# Patient Record
Sex: MALE | Race: WHITE | NOT HISPANIC OR LATINO | URBAN - METROPOLITAN AREA
[De-identification: names, ages, dates, MRNs, and addresses within clinical notes are randomized per-mention and may not be internally consistent; named-entity substitution may affect disease eponyms.]

---

## 2017-01-13 ENCOUNTER — OUTPATIENT (OUTPATIENT)
Dept: OUTPATIENT SERVICES | Facility: HOSPITAL | Age: 67
LOS: 1 days | End: 2017-01-13
Payer: COMMERCIAL

## 2017-01-13 VITALS
WEIGHT: 139.77 LBS | HEART RATE: 53 BPM | HEIGHT: 67 IN | SYSTOLIC BLOOD PRESSURE: 128 MMHG | TEMPERATURE: 97 F | RESPIRATION RATE: 16 BRPM | DIASTOLIC BLOOD PRESSURE: 60 MMHG

## 2017-01-13 DIAGNOSIS — Z01.818 ENCOUNTER FOR OTHER PREPROCEDURAL EXAMINATION: ICD-10-CM

## 2017-01-13 DIAGNOSIS — M67.919 UNSPECIFIED DISORDER OF SYNOVIUM AND TENDON, UNSPECIFIED SHOULDER: Chronic | ICD-10-CM

## 2017-01-13 DIAGNOSIS — I25.10 ATHEROSCLEROTIC HEART DISEASE OF NATIVE CORONARY ARTERY WITHOUT ANGINA PECTORIS: ICD-10-CM

## 2017-01-13 DIAGNOSIS — R91.8 OTHER NONSPECIFIC ABNORMAL FINDING OF LUNG FIELD: ICD-10-CM

## 2017-01-13 DIAGNOSIS — Z98.890 OTHER SPECIFIED POSTPROCEDURAL STATES: Chronic | ICD-10-CM

## 2017-01-13 DIAGNOSIS — I25.10 ATHEROSCLEROTIC HEART DISEASE OF NATIVE CORONARY ARTERY WITHOUT ANGINA PECTORIS: Chronic | ICD-10-CM

## 2017-01-13 LAB
ANION GAP SERPL CALC-SCNC: 12 MMOL/L — SIGNIFICANT CHANGE UP (ref 5–17)
APTT BLD: 34.6 SEC — SIGNIFICANT CHANGE UP (ref 27.5–37.4)
BLD GP AB SCN SERPL QL: SIGNIFICANT CHANGE UP
BUN SERPL-MCNC: 13 MG/DL — SIGNIFICANT CHANGE UP (ref 8–20)
CALCIUM SERPL-MCNC: 9.1 MG/DL — SIGNIFICANT CHANGE UP (ref 8.6–10.2)
CHLORIDE SERPL-SCNC: 100 MMOL/L — SIGNIFICANT CHANGE UP (ref 98–107)
CO2 SERPL-SCNC: 30 MMOL/L — HIGH (ref 22–29)
CREAT SERPL-MCNC: 0.65 MG/DL — SIGNIFICANT CHANGE UP (ref 0.5–1.3)
GLUCOSE SERPL-MCNC: 91 MG/DL — SIGNIFICANT CHANGE UP (ref 70–115)
HCT VFR BLD CALC: 39.9 % — LOW (ref 42–52)
HGB BLD-MCNC: 13.1 G/DL — LOW (ref 14–18)
INR BLD: 1.04 RATIO — SIGNIFICANT CHANGE UP (ref 0.88–1.16)
MCHC RBC-ENTMCNC: 29.2 PG — SIGNIFICANT CHANGE UP (ref 27–31)
MCHC RBC-ENTMCNC: 32.8 G/DL — SIGNIFICANT CHANGE UP (ref 32–36)
MCV RBC AUTO: 89.1 FL — SIGNIFICANT CHANGE UP (ref 80–94)
PLATELET # BLD AUTO: 253 K/UL — SIGNIFICANT CHANGE UP (ref 150–400)
POTASSIUM SERPL-MCNC: 4.2 MMOL/L — SIGNIFICANT CHANGE UP (ref 3.5–5.3)
POTASSIUM SERPL-SCNC: 4.2 MMOL/L — SIGNIFICANT CHANGE UP (ref 3.5–5.3)
PROTHROM AB SERPL-ACNC: 11.5 SEC — SIGNIFICANT CHANGE UP (ref 10–13.1)
RBC # BLD: 4.48 M/UL — LOW (ref 4.6–6.2)
RBC # FLD: 13.3 % — SIGNIFICANT CHANGE UP (ref 11–15.6)
SODIUM SERPL-SCNC: 142 MMOL/L — SIGNIFICANT CHANGE UP (ref 135–145)
TYPE + AB SCN PNL BLD: SIGNIFICANT CHANGE UP
WBC # BLD: 7.69 K/UL — SIGNIFICANT CHANGE UP (ref 4.8–10.8)
WBC # FLD AUTO: 7.69 K/UL — SIGNIFICANT CHANGE UP (ref 4.8–10.8)

## 2017-01-13 PROCEDURE — 85730 THROMBOPLASTIN TIME PARTIAL: CPT

## 2017-01-13 PROCEDURE — 86901 BLOOD TYPING SEROLOGIC RH(D): CPT

## 2017-01-13 PROCEDURE — G0463: CPT

## 2017-01-13 PROCEDURE — 85027 COMPLETE CBC AUTOMATED: CPT

## 2017-01-13 PROCEDURE — 86850 RBC ANTIBODY SCREEN: CPT

## 2017-01-13 PROCEDURE — 93005 ELECTROCARDIOGRAM TRACING: CPT

## 2017-01-13 PROCEDURE — 85610 PROTHROMBIN TIME: CPT

## 2017-01-13 PROCEDURE — 86900 BLOOD TYPING SEROLOGIC ABO: CPT

## 2017-01-13 PROCEDURE — 93010 ELECTROCARDIOGRAM REPORT: CPT

## 2017-01-13 PROCEDURE — 80048 BASIC METABOLIC PNL TOTAL CA: CPT

## 2017-01-13 RX ORDER — CEFAZOLIN SODIUM 1 G
2000 VIAL (EA) INJECTION ONCE
Qty: 0 | Refills: 0 | Status: DISCONTINUED | OUTPATIENT
Start: 2017-01-27 | End: 2017-01-30

## 2017-01-13 NOTE — PATIENT PROFILE ADULT. - LEARNING ASSESSMENT (PATIENT) ADDITIONAL COMMENTS
presurgical, surgical scrub instructions, pain management education given - verbalized understanding

## 2017-01-13 NOTE — H&P PST ADULT - PMH
Alcoholism /alcohol abuse  Last drink 30 years ago  CAD (coronary artery disease)    Hypertension    Hypothyroidism

## 2017-01-13 NOTE — H&P PST ADULT - HISTORY OF PRESENT ILLNESS
This is a 66 y.o male who presents to [facility] today.  The pt had a  PET scan in July 2016 which demonstrated an abnormal finding and a subsequent CT demonstrated it had enlarged.  He is scheduled for surgery in the near future.

## 2017-01-13 NOTE — H&P PST ADULT - NSANTHOSAYNRD_GEN_A_CORE
No. JAIDA screening performed.  STOP BANG Legend: 0-2 = LOW Risk; 3-4 = INTERMEDIATE Risk; 5-8 = HIGH Risk

## 2017-01-13 NOTE — H&P PST ADULT - PSH
CAD S/P percutaneous coronary angioplasty  Stent x 2  Disorder of rotator cuff  Left  S/P eye surgery  right

## 2017-01-13 NOTE — H&P PST ADULT - CARDIOVASCULAR COMMENTS
Distant heart tones noted but pt has audible murmur.  Radiating to bilateral carotid arteries where murmur is much more prominent

## 2017-01-17 DIAGNOSIS — R91.8 OTHER NONSPECIFIC ABNORMAL FINDING OF LUNG FIELD: ICD-10-CM

## 2017-01-17 DIAGNOSIS — Z01.818 ENCOUNTER FOR OTHER PREPROCEDURAL EXAMINATION: ICD-10-CM

## 2017-01-26 ENCOUNTER — RESULT REVIEW (OUTPATIENT)
Age: 67
End: 2017-01-26

## 2017-01-27 ENCOUNTER — INPATIENT (INPATIENT)
Facility: HOSPITAL | Age: 67
LOS: 3 days | Discharge: HOME | DRG: 165 | End: 2017-01-31
Attending: THORACIC SURGERY (CARDIOTHORACIC VASCULAR SURGERY) | Admitting: THORACIC SURGERY (CARDIOTHORACIC VASCULAR SURGERY)
Payer: COMMERCIAL

## 2017-01-27 ENCOUNTER — APPOINTMENT (OUTPATIENT)
Dept: THORACIC SURGERY | Facility: HOSPITAL | Age: 67
End: 2017-01-27
Payer: MEDICAID

## 2017-01-27 VITALS
SYSTOLIC BLOOD PRESSURE: 116 MMHG | HEIGHT: 67 IN | RESPIRATION RATE: 16 BRPM | TEMPERATURE: 98 F | HEART RATE: 50 BPM | DIASTOLIC BLOOD PRESSURE: 46 MMHG | OXYGEN SATURATION: 99 % | WEIGHT: 139.77 LBS

## 2017-01-27 DIAGNOSIS — I25.10 ATHEROSCLEROTIC HEART DISEASE OF NATIVE CORONARY ARTERY WITHOUT ANGINA PECTORIS: Chronic | ICD-10-CM

## 2017-01-27 DIAGNOSIS — M67.919 UNSPECIFIED DISORDER OF SYNOVIUM AND TENDON, UNSPECIFIED SHOULDER: Chronic | ICD-10-CM

## 2017-01-27 DIAGNOSIS — R91.8 OTHER NONSPECIFIC ABNORMAL FINDING OF LUNG FIELD: ICD-10-CM

## 2017-01-27 DIAGNOSIS — Z98.890 OTHER SPECIFIED POSTPROCEDURAL STATES: Chronic | ICD-10-CM

## 2017-01-27 LAB
BLD GP AB SCN SERPL QL: SIGNIFICANT CHANGE UP
TYPE + AB SCN PNL BLD: SIGNIFICANT CHANGE UP

## 2017-01-27 PROCEDURE — 71010: CPT | Mod: 26

## 2017-01-27 PROCEDURE — 32674 THORACOSCOPY LYMPH NODE EXC: CPT | Mod: AS

## 2017-01-27 PROCEDURE — S2900 ROBOTIC SURGICAL SYSTEM: CPT | Mod: NC

## 2017-01-27 PROCEDURE — 32674 THORACOSCOPY LYMPH NODE EXC: CPT

## 2017-01-27 PROCEDURE — 88342 IMHCHEM/IMCYTCHM 1ST ANTB: CPT | Mod: 26

## 2017-01-27 PROCEDURE — 32663 THORACOSCOPY W/LOBECTOMY: CPT

## 2017-01-27 PROCEDURE — 31622 DX BRONCHOSCOPE/WASH: CPT

## 2017-01-27 PROCEDURE — 88341 IMHCHEM/IMCYTCHM EA ADD ANTB: CPT | Mod: 26

## 2017-01-27 PROCEDURE — 32663 THORACOSCOPY W/LOBECTOMY: CPT | Mod: AS

## 2017-01-27 RX ORDER — ONDANSETRON 8 MG/1
4 TABLET, FILM COATED ORAL ONCE
Qty: 0 | Refills: 0 | Status: DISCONTINUED | OUTPATIENT
Start: 2017-01-27 | End: 2017-01-27

## 2017-01-27 RX ORDER — NALOXONE HYDROCHLORIDE 4 MG/.1ML
0.1 SPRAY NASAL
Qty: 0 | Refills: 0 | Status: DISCONTINUED | OUTPATIENT
Start: 2017-01-27 | End: 2017-01-27

## 2017-01-27 RX ORDER — CLOPIDOGREL BISULFATE 75 MG/1
75 TABLET, FILM COATED ORAL DAILY
Qty: 0 | Refills: 0 | Status: DISCONTINUED | OUTPATIENT
Start: 2017-01-28 | End: 2017-01-31

## 2017-01-27 RX ORDER — TIOTROPIUM BROMIDE 18 UG/1
1 CAPSULE ORAL; RESPIRATORY (INHALATION) DAILY
Qty: 0 | Refills: 0 | Status: DISCONTINUED | OUTPATIENT
Start: 2017-01-27 | End: 2017-01-31

## 2017-01-27 RX ORDER — ONDANSETRON 8 MG/1
4 TABLET, FILM COATED ORAL EVERY 6 HOURS
Qty: 0 | Refills: 0 | Status: DISCONTINUED | OUTPATIENT
Start: 2017-01-27 | End: 2017-01-31

## 2017-01-27 RX ORDER — DOCUSATE SODIUM 100 MG
100 CAPSULE ORAL THREE TIMES A DAY
Qty: 0 | Refills: 0 | Status: DISCONTINUED | OUTPATIENT
Start: 2017-01-28 | End: 2017-01-31

## 2017-01-27 RX ORDER — HYDROMORPHONE HYDROCHLORIDE 2 MG/ML
30 INJECTION INTRAMUSCULAR; INTRAVENOUS; SUBCUTANEOUS
Qty: 0 | Refills: 0 | Status: DISCONTINUED | OUTPATIENT
Start: 2017-01-27 | End: 2017-01-28

## 2017-01-27 RX ORDER — SODIUM CHLORIDE 9 MG/ML
3 INJECTION INTRAMUSCULAR; INTRAVENOUS; SUBCUTANEOUS EVERY 8 HOURS
Qty: 0 | Refills: 0 | Status: DISCONTINUED | OUTPATIENT
Start: 2017-01-27 | End: 2017-01-27

## 2017-01-27 RX ORDER — SODIUM CHLORIDE 9 MG/ML
1000 INJECTION, SOLUTION INTRAVENOUS
Qty: 0 | Refills: 0 | Status: DISCONTINUED | OUTPATIENT
Start: 2017-01-27 | End: 2017-01-28

## 2017-01-27 RX ORDER — ONDANSETRON 8 MG/1
4 TABLET, FILM COATED ORAL EVERY 6 HOURS
Qty: 0 | Refills: 0 | Status: DISCONTINUED | OUTPATIENT
Start: 2017-01-27 | End: 2017-01-27

## 2017-01-27 RX ORDER — FENTANYL CITRATE 50 UG/ML
30 INJECTION INTRAVENOUS
Qty: 0 | Refills: 0 | Status: DISCONTINUED | OUTPATIENT
Start: 2017-01-27 | End: 2017-01-27

## 2017-01-27 RX ORDER — ALBUTEROL 90 UG/1
2.5 AEROSOL, METERED ORAL EVERY 6 HOURS
Qty: 0 | Refills: 0 | Status: DISCONTINUED | OUTPATIENT
Start: 2017-01-27 | End: 2017-01-31

## 2017-01-27 RX ORDER — ACETAMINOPHEN 500 MG
1000 TABLET ORAL ONCE
Qty: 0 | Refills: 0 | Status: DISCONTINUED | OUTPATIENT
Start: 2017-01-27 | End: 2017-01-28

## 2017-01-27 RX ORDER — NALOXONE HYDROCHLORIDE 4 MG/.1ML
0.1 SPRAY NASAL
Qty: 0 | Refills: 0 | Status: DISCONTINUED | OUTPATIENT
Start: 2017-01-27 | End: 2017-01-28

## 2017-01-27 RX ORDER — NICOTINE POLACRILEX 2 MG
1 GUM BUCCAL DAILY
Qty: 0 | Refills: 0 | Status: DISCONTINUED | OUTPATIENT
Start: 2017-01-27 | End: 2017-01-31

## 2017-01-27 RX ORDER — SODIUM CHLORIDE 9 MG/ML
1000 INJECTION, SOLUTION INTRAVENOUS
Qty: 0 | Refills: 0 | Status: DISCONTINUED | OUTPATIENT
Start: 2017-01-27 | End: 2017-01-27

## 2017-01-27 RX ORDER — METOPROLOL TARTRATE 50 MG
50 TABLET ORAL DAILY
Qty: 0 | Refills: 0 | Status: DISCONTINUED | OUTPATIENT
Start: 2017-01-28 | End: 2017-01-31

## 2017-01-27 RX ORDER — LISINOPRIL 2.5 MG/1
5 TABLET ORAL DAILY
Qty: 0 | Refills: 0 | Status: DISCONTINUED | OUTPATIENT
Start: 2017-01-28 | End: 2017-01-31

## 2017-01-27 RX ORDER — ATORVASTATIN CALCIUM 80 MG/1
80 TABLET, FILM COATED ORAL AT BEDTIME
Qty: 0 | Refills: 0 | Status: DISCONTINUED | OUTPATIENT
Start: 2017-01-28 | End: 2017-01-31

## 2017-01-27 RX ORDER — LEVOTHYROXINE SODIUM 125 MCG
150 TABLET ORAL DAILY
Qty: 0 | Refills: 0 | Status: DISCONTINUED | OUTPATIENT
Start: 2017-01-28 | End: 2017-01-31

## 2017-01-27 RX ORDER — IPRATROPIUM BROMIDE 0.2 MG/ML
500 SOLUTION, NON-ORAL INHALATION EVERY 6 HOURS
Qty: 0 | Refills: 0 | Status: DISCONTINUED | OUTPATIENT
Start: 2017-01-27 | End: 2017-01-31

## 2017-01-27 RX ORDER — ASPIRIN/CALCIUM CARB/MAGNESIUM 324 MG
81 TABLET ORAL DAILY
Qty: 0 | Refills: 0 | Status: DISCONTINUED | OUTPATIENT
Start: 2017-01-28 | End: 2017-01-31

## 2017-01-27 RX ORDER — FENTANYL CITRATE 50 UG/ML
50 INJECTION INTRAVENOUS
Qty: 0 | Refills: 0 | Status: DISCONTINUED | OUTPATIENT
Start: 2017-01-27 | End: 2017-01-27

## 2017-01-27 RX ORDER — ENOXAPARIN SODIUM 100 MG/ML
40 INJECTION SUBCUTANEOUS EVERY 24 HOURS
Qty: 0 | Refills: 0 | Status: DISCONTINUED | OUTPATIENT
Start: 2017-01-28 | End: 2017-01-31

## 2017-01-27 RX ADMIN — Medication 1 PATCH: at 15:22

## 2017-01-27 RX ADMIN — FENTANYL CITRATE 50 MICROGRAM(S): 50 INJECTION INTRAVENOUS at 14:00

## 2017-01-27 RX ADMIN — Medication 500 MICROGRAM(S): at 14:00

## 2017-01-27 RX ADMIN — ALBUTEROL 2.5 MILLIGRAM(S): 90 AEROSOL, METERED ORAL at 14:00

## 2017-01-27 RX ADMIN — HYDROMORPHONE HYDROCHLORIDE 30 MILLILITER(S): 2 INJECTION INTRAMUSCULAR; INTRAVENOUS; SUBCUTANEOUS at 18:30

## 2017-01-27 RX ADMIN — HYDROMORPHONE HYDROCHLORIDE 30 MILLILITER(S): 2 INJECTION INTRAMUSCULAR; INTRAVENOUS; SUBCUTANEOUS at 19:23

## 2017-01-27 RX ADMIN — FENTANYL CITRATE 30 MILLILITER(S): 50 INJECTION INTRAVENOUS at 14:24

## 2017-01-27 RX ADMIN — SODIUM CHLORIDE 100 MILLILITER(S): 9 INJECTION, SOLUTION INTRAVENOUS at 14:53

## 2017-01-27 RX ADMIN — HYDROMORPHONE HYDROCHLORIDE 30 MILLILITER(S): 2 INJECTION INTRAMUSCULAR; INTRAVENOUS; SUBCUTANEOUS at 17:06

## 2017-01-27 NOTE — BRIEF OPERATIVE NOTE - PROCEDURE
Left upper lobectomy of lung  01/27/2017  Flex Bronch, Robotically Assisted Left Upper Lobectomy with Mediastinal Lymph Node Biopsy  Active  [id number]

## 2017-01-28 PROBLEM — F10.20 ALCOHOL DEPENDENCE, UNCOMPLICATED: Chronic | Status: ACTIVE | Noted: 2017-01-13

## 2017-01-28 PROBLEM — E03.9 HYPOTHYROIDISM, UNSPECIFIED: Chronic | Status: ACTIVE | Noted: 2017-01-13

## 2017-01-28 PROBLEM — I10 ESSENTIAL (PRIMARY) HYPERTENSION: Chronic | Status: ACTIVE | Noted: 2017-01-13

## 2017-01-28 PROBLEM — I25.10 ATHEROSCLEROTIC HEART DISEASE OF NATIVE CORONARY ARTERY WITHOUT ANGINA PECTORIS: Chronic | Status: ACTIVE | Noted: 2017-01-13

## 2017-01-28 PROCEDURE — 71010: CPT | Mod: 26

## 2017-01-28 RX ORDER — UMECLIDINIUM 62.5 UG/1
0 AEROSOL, POWDER ORAL
Qty: 0 | Refills: 0 | COMMUNITY

## 2017-01-28 RX ORDER — METOPROLOL TARTRATE 50 MG
1 TABLET ORAL
Qty: 0 | Refills: 0 | COMMUNITY

## 2017-01-28 RX ORDER — LEVOTHYROXINE SODIUM 125 MCG
1 TABLET ORAL
Qty: 0 | Refills: 0 | COMMUNITY

## 2017-01-28 RX ORDER — ASPIRIN/CALCIUM CARB/MAGNESIUM 324 MG
1 TABLET ORAL
Qty: 0 | Refills: 0 | COMMUNITY

## 2017-01-28 RX ORDER — KETOROLAC TROMETHAMINE 30 MG/ML
30 SYRINGE (ML) INJECTION ONCE
Qty: 0 | Refills: 0 | Status: DISCONTINUED | OUTPATIENT
Start: 2017-01-28 | End: 2017-01-28

## 2017-01-28 RX ORDER — LISINOPRIL 2.5 MG/1
0 TABLET ORAL
Qty: 0 | Refills: 0 | COMMUNITY

## 2017-01-28 RX ORDER — CLOPIDOGREL BISULFATE 75 MG/1
1 TABLET, FILM COATED ORAL
Qty: 0 | Refills: 0 | COMMUNITY

## 2017-01-28 RX ORDER — OXYCODONE HYDROCHLORIDE 5 MG/1
10 TABLET ORAL EVERY 8 HOURS
Qty: 0 | Refills: 0 | Status: DISCONTINUED | OUTPATIENT
Start: 2017-01-28 | End: 2017-01-31

## 2017-01-28 RX ORDER — ATORVASTATIN CALCIUM 80 MG/1
1 TABLET, FILM COATED ORAL
Qty: 0 | Refills: 0 | COMMUNITY

## 2017-01-28 RX ADMIN — OXYCODONE HYDROCHLORIDE 10 MILLIGRAM(S): 5 TABLET ORAL at 11:35

## 2017-01-28 RX ADMIN — Medication 100 MILLIGRAM(S): at 21:21

## 2017-01-28 RX ADMIN — Medication 1 PATCH: at 15:03

## 2017-01-28 RX ADMIN — ENOXAPARIN SODIUM 40 MILLIGRAM(S): 100 INJECTION SUBCUTANEOUS at 21:20

## 2017-01-28 RX ADMIN — Medication 30 MILLIGRAM(S): at 15:57

## 2017-01-28 RX ADMIN — HYDROMORPHONE HYDROCHLORIDE 30 MILLILITER(S): 2 INJECTION INTRAMUSCULAR; INTRAVENOUS; SUBCUTANEOUS at 07:30

## 2017-01-28 RX ADMIN — LISINOPRIL 5 MILLIGRAM(S): 2.5 TABLET ORAL at 11:30

## 2017-01-28 RX ADMIN — Medication 150 MICROGRAM(S): at 11:35

## 2017-01-28 RX ADMIN — OXYCODONE HYDROCHLORIDE 10 MILLIGRAM(S): 5 TABLET ORAL at 12:35

## 2017-01-28 RX ADMIN — OXYCODONE HYDROCHLORIDE 10 MILLIGRAM(S): 5 TABLET ORAL at 20:22

## 2017-01-28 RX ADMIN — Medication 1 PATCH: at 11:34

## 2017-01-28 RX ADMIN — Medication 50 MILLIGRAM(S): at 11:30

## 2017-01-28 RX ADMIN — OXYCODONE HYDROCHLORIDE 10 MILLIGRAM(S): 5 TABLET ORAL at 21:15

## 2017-01-28 RX ADMIN — TIOTROPIUM BROMIDE 1 CAPSULE(S): 18 CAPSULE ORAL; RESPIRATORY (INHALATION) at 09:21

## 2017-01-28 RX ADMIN — ATORVASTATIN CALCIUM 80 MILLIGRAM(S): 80 TABLET, FILM COATED ORAL at 21:21

## 2017-01-28 RX ADMIN — CLOPIDOGREL BISULFATE 75 MILLIGRAM(S): 75 TABLET, FILM COATED ORAL at 11:30

## 2017-01-28 RX ADMIN — Medication 100 MILLIGRAM(S): at 13:02

## 2017-01-28 RX ADMIN — Medication 81 MILLIGRAM(S): at 11:30

## 2017-01-28 RX ADMIN — Medication 30 MILLIGRAM(S): at 16:13

## 2017-01-28 NOTE — PROGRESS NOTE ADULT - SUBJECTIVE AND OBJECTIVE BOX
Subjective: "Pain not too bad...I have had worse"    T(C): 36.6, Max: 36.6 (01-27 @ 13:15)  HR: 71 (61 - 82)  BP: 120/50 (100/58 - 147/51)  ABP: 139/50 (121/54 - 166/55)  RR: 17 (13 - 24)  SpO2: 100% (97% - 100%)  Tele:  SR   70's    CHEST TUBE:    L posterior            OUTPUT:  140 per 12 hours     Drainage:  serosang  AIR LEAKS:  [x ] YES [ ] NO           CXR: L CT insitu, + lung volume loss No PTX      Physical Exam:    Neuro: alert, OOB     Pulm: diminshed at L base    CV: S1S2  RRR    Abd: soft, non tender + BS     Extremities: no edema, denies calf pain  :  indwelling miller> clear yellow        Assessment:65 y/o male current tobacco,  previous ETOH abuse, CAD w/ stents, HTN  ^ chol COPD L eyelid swelling now S/P L VATS  1/27      PAST MEDICAL & SURGICAL HISTORY:  Alcoholism /alcohol abuse: Last drink 30 years ago  CAD (coronary artery disease)  Hypothyroidism  Hypertension  Disorder of rotator cuff: Left  S/P eye surgery: right  CAD S/P percutaneous coronary angioplasty: Stent x 2        Plan:  Continue ASA plavix statin betablocker CAD  Nebs COPD   synthroid hypothyroidism  nicotine patch tobacco cessation  Education/instruction re tobacco cessation reinforced  D/C  PCA>add oral analgesics> pt not narcotic naive...treats back injury w/ oxycodone @ home  D/C miller  Place chest tube to waterseal  Discussed am rounds Dr Alfonso

## 2017-01-28 NOTE — PROGRESS NOTE ADULT - SUBJECTIVE AND OBJECTIVE BOX
66 year old Male, S/P Flexible Bronchoscopy Robotic Assisted Left Lung Resection under [facility] GETA on 1/27/17. Patient is up and out of bed to chair, in no distress, Left chest tube in progress. Vital signs are stable. Patient had no complaints or complications with regard to Anesthesia Care.

## 2017-01-29 LAB
ALBUMIN SERPL ELPH-MCNC: 3.2 G/DL — LOW (ref 3.3–5.2)
ALP SERPL-CCNC: 70 U/L — SIGNIFICANT CHANGE UP (ref 40–120)
ALT FLD-CCNC: 14 U/L — SIGNIFICANT CHANGE UP
ANION GAP SERPL CALC-SCNC: 11 MMOL/L — SIGNIFICANT CHANGE UP (ref 5–17)
AST SERPL-CCNC: 34 U/L — SIGNIFICANT CHANGE UP
BASOPHILS # BLD AUTO: 0 K/UL — SIGNIFICANT CHANGE UP (ref 0–0.2)
BASOPHILS NFR BLD AUTO: 0.1 % — SIGNIFICANT CHANGE UP (ref 0–2)
BILIRUB SERPL-MCNC: 0.5 MG/DL — SIGNIFICANT CHANGE UP (ref 0.4–2)
BUN SERPL-MCNC: 11 MG/DL — SIGNIFICANT CHANGE UP (ref 8–20)
CALCIUM SERPL-MCNC: 9 MG/DL — SIGNIFICANT CHANGE UP (ref 8.6–10.2)
CHLORIDE SERPL-SCNC: 95 MMOL/L — LOW (ref 98–107)
CO2 SERPL-SCNC: 30 MMOL/L — HIGH (ref 22–29)
CREAT SERPL-MCNC: 0.61 MG/DL — SIGNIFICANT CHANGE UP (ref 0.5–1.3)
EOSINOPHIL # BLD AUTO: 0 K/UL — SIGNIFICANT CHANGE UP (ref 0–0.5)
EOSINOPHIL NFR BLD AUTO: 0.4 % — SIGNIFICANT CHANGE UP (ref 0–5)
GLUCOSE SERPL-MCNC: 105 MG/DL — SIGNIFICANT CHANGE UP (ref 70–115)
HCT VFR BLD CALC: 29.2 % — LOW (ref 42–52)
HGB BLD-MCNC: 9.8 G/DL — LOW (ref 14–18)
LYMPHOCYTES # BLD AUTO: 18.6 % — LOW (ref 20–55)
LYMPHOCYTES # BLD AUTO: 2.4 K/UL — SIGNIFICANT CHANGE UP (ref 1–4.8)
MCHC RBC-ENTMCNC: 28.9 PG — SIGNIFICANT CHANGE UP (ref 27–31)
MCHC RBC-ENTMCNC: 33.6 G/DL — SIGNIFICANT CHANGE UP (ref 32–36)
MCV RBC AUTO: 86.1 FL — SIGNIFICANT CHANGE UP (ref 80–94)
MONOCYTES # BLD AUTO: 0.9 K/UL — HIGH (ref 0–0.8)
MONOCYTES NFR BLD AUTO: 7.3 % — SIGNIFICANT CHANGE UP (ref 3–10)
NEUTROPHILS # BLD AUTO: 9.2 K/UL — HIGH (ref 1.8–8)
NEUTROPHILS NFR BLD AUTO: 73.4 % — HIGH (ref 37–73)
PLATELET # BLD AUTO: 195 K/UL — SIGNIFICANT CHANGE UP (ref 150–400)
POTASSIUM SERPL-MCNC: 4 MMOL/L — SIGNIFICANT CHANGE UP (ref 3.5–5.3)
POTASSIUM SERPL-SCNC: 4 MMOL/L — SIGNIFICANT CHANGE UP (ref 3.5–5.3)
PROT SERPL-MCNC: 6 G/DL — LOW (ref 6.6–8.7)
RBC # BLD: 3.39 M/UL — LOW (ref 4.6–6.2)
RBC # FLD: 13.1 % — SIGNIFICANT CHANGE UP (ref 11–15.6)
SODIUM SERPL-SCNC: 136 MMOL/L — SIGNIFICANT CHANGE UP (ref 135–145)
WBC # BLD: 12.61 K/UL — HIGH (ref 4.8–10.8)
WBC # FLD AUTO: 12.61 K/UL — HIGH (ref 4.8–10.8)

## 2017-01-29 PROCEDURE — 71010: CPT | Mod: 26

## 2017-01-29 RX ORDER — SENNA PLUS 8.6 MG/1
2 TABLET ORAL AT BEDTIME
Qty: 0 | Refills: 0 | Status: DISCONTINUED | OUTPATIENT
Start: 2017-01-29 | End: 2017-01-31

## 2017-01-29 RX ORDER — PANTOPRAZOLE SODIUM 20 MG/1
40 TABLET, DELAYED RELEASE ORAL
Qty: 0 | Refills: 0 | Status: DISCONTINUED | OUTPATIENT
Start: 2017-01-29 | End: 2017-01-31

## 2017-01-29 RX ORDER — FENTANYL CITRATE 50 UG/ML
50 INJECTION INTRAVENOUS ONCE
Qty: 0 | Refills: 0 | Status: DISCONTINUED | OUTPATIENT
Start: 2017-01-29 | End: 2017-01-30

## 2017-01-29 RX ORDER — MAGNESIUM HYDROXIDE 400 MG/1
30 TABLET, CHEWABLE ORAL DAILY
Qty: 0 | Refills: 0 | Status: DISCONTINUED | OUTPATIENT
Start: 2017-01-29 | End: 2017-01-31

## 2017-01-29 RX ADMIN — OXYCODONE HYDROCHLORIDE 10 MILLIGRAM(S): 5 TABLET ORAL at 12:03

## 2017-01-29 RX ADMIN — SENNA PLUS 2 TABLET(S): 8.6 TABLET ORAL at 21:18

## 2017-01-29 RX ADMIN — Medication 1 PATCH: at 11:05

## 2017-01-29 RX ADMIN — OXYCODONE HYDROCHLORIDE 10 MILLIGRAM(S): 5 TABLET ORAL at 04:30

## 2017-01-29 RX ADMIN — Medication 100 MILLIGRAM(S): at 21:18

## 2017-01-29 RX ADMIN — ENOXAPARIN SODIUM 40 MILLIGRAM(S): 100 INJECTION SUBCUTANEOUS at 21:20

## 2017-01-29 RX ADMIN — Medication 81 MILLIGRAM(S): at 11:03

## 2017-01-29 RX ADMIN — OXYCODONE HYDROCHLORIDE 10 MILLIGRAM(S): 5 TABLET ORAL at 11:03

## 2017-01-29 RX ADMIN — Medication 50 MILLIGRAM(S): at 05:28

## 2017-01-29 RX ADMIN — Medication 100 MILLIGRAM(S): at 13:11

## 2017-01-29 RX ADMIN — CLOPIDOGREL BISULFATE 75 MILLIGRAM(S): 75 TABLET, FILM COATED ORAL at 11:03

## 2017-01-29 RX ADMIN — OXYCODONE HYDROCHLORIDE 10 MILLIGRAM(S): 5 TABLET ORAL at 18:55

## 2017-01-29 RX ADMIN — OXYCODONE HYDROCHLORIDE 10 MILLIGRAM(S): 5 TABLET ORAL at 20:42

## 2017-01-29 RX ADMIN — Medication 100 MILLIGRAM(S): at 05:28

## 2017-01-29 RX ADMIN — LISINOPRIL 5 MILLIGRAM(S): 2.5 TABLET ORAL at 05:28

## 2017-01-29 RX ADMIN — Medication 1 PATCH: at 11:03

## 2017-01-29 RX ADMIN — OXYCODONE HYDROCHLORIDE 10 MILLIGRAM(S): 5 TABLET ORAL at 03:47

## 2017-01-29 RX ADMIN — Medication 150 MICROGRAM(S): at 05:28

## 2017-01-29 RX ADMIN — TIOTROPIUM BROMIDE 1 CAPSULE(S): 18 CAPSULE ORAL; RESPIRATORY (INHALATION) at 09:25

## 2017-01-29 RX ADMIN — ATORVASTATIN CALCIUM 80 MILLIGRAM(S): 80 TABLET, FILM COATED ORAL at 21:18

## 2017-01-29 NOTE — PROGRESS NOTE ADULT - SUBJECTIVE AND OBJECTIVE BOX
Subjective: "I wish this tube could come out."  Sitting up in bed.  Denies CP or SOB.  NAD noted.  Wife at bedside.      Tele: SR                         T(F): 98.3, Max: 98.7 ( @ 11:07)  HR: 78 (66 - 80)  BP: 140/64 (112/60 - 160/54)  RR: 18 (18 - 18)  SpO2: 96% (96% - 99%) on room air at rest.          Daily     Daily Weight in k.9 (2017 00:30)        No Known Allergies      2017 05:22    136    |  95     |  11.0   ----------------------------<  105    4.0     |  30.0   |  0.61     Ca    9.0        2017 05:22    TPro  6.0    /  Alb  3.2    /  TBili  0.5    /  DBili  x      /  AST  34     /  ALT  14     /  AlkPhos  70     2017 05:22                               9.8    12.61 )-----------( 195      ( 2017 05:22 )             29.2          CXR:   IMPRESSION: Left-sided chest tube noted in place. No appreciable   pneumothorax is seen. No gross consolidation is seen.           ISABEL DAI M.D., ATTENDINGRADIOLOGIST  This document has been electronically signed. 2017 10:28AM    I&O's Detail  I & Os for 24h ending 2017 07:00  =============================================  IN:    Oral Fluid: 660 ml    lactated ringers.: 30 ml    Total IN: 690 ml  ---------------------------------------------  OUT:    Voided: 1825 ml    Indwelling Catheter - Urethral: 350 ml    Chest Tube: 240 ml    Total OUT: 2415 ml  ---------------------------------------------  Total NET: -1725 ml    I & Os for current day (as of 2017 17:25)  =============================================  IN:    Oral Fluid: 940 ml    Total IN: 940 ml  ---------------------------------------------  OUT:    Voided: 1020 ml    Chest Tube: 40 ml    Total OUT: 1060 ml  ---------------------------------------------  Total NET: -120 ml      CHEST TUBE:  [* ] YES [ ] NO  OUTPUT: Left pleural CT to waterseal. 120ml overnight (240ml x last 24 hours)   AIR LEAKS:  [ *] YES [ ] NO        Active Medications:  ceFAZolin   IVPB 2000milliGRAM(s) IV Intermittent once  enoxaparin Injectable 40milliGRAM(s) SubCutaneous every 24 hours  ALBUTerol    0.083% 2.5milliGRAM(s) Nebulizer every 6 hours PRN  ipratropium    for Nebulization 500MICROGram(s) Nebulizer every 6 hours PRN  docusate sodium 100milliGRAM(s) Oral three times a day  aspirin enteric coated 81milliGRAM(s) Oral daily  lisinopril 5milliGRAM(s) Oral daily  atorvastatin 80milliGRAM(s) Oral at bedtime  metoprolol succinate ER 50milliGRAM(s) Oral daily  levothyroxine 150MICROGram(s) Oral daily  clopidogrel Tablet 75milliGRAM(s) Oral daily  tiotropium 18 MICROgram(s) Capsule 1Capsule(s) Inhalation daily  nicotine - 21 mG/24Hr(s) Patch 1patch Transdermal daily  ondansetron Injectable 4milliGRAM(s) IV Push every 6 hours PRN  ondansetron Injectable 4milliGRAM(s) IV Push every 6 hours PRN  oxyCODONE IR 10milliGRAM(s) Oral every 8 hours PRN  pantoprazole Tablet 40milliGRAM(s) Oral before breakfast      Physical Exam:    Neuro: AAOX3.  No focal deficits.    Pulm: Diminished BLL.  Scattered rhonchi bilaterally.    CV: RRR.  +S1+S2.    Abd: Soft/NT/ND.  +BS.  -BM.  +flatus.    Extremities: No edema BLE.  +pp.  - calf tenderness.    Incision(s): Left thoracotomy site ELSI and without erythema or drainage.  CT site with DSD.  Minimal Crepitus surrounding CT site.  + air leak noted.    Assessment:  66y Male  PMH CAD w/ stents, HTN , HLD,  COPD, and  L eyelid swelling.  Current tobacco Smoker.      S/P L VATS          Post operative course:  + airleak and subcutaneous emphysema to L CT site.         PAST MEDICAL & SURGICAL HISTORY:  Alcoholism /alcohol abuse: Last drink 30 years ago  CAD (coronary artery disease)  Hypothyroidism  Hypertension  Disorder of rotator cuff: Left  S/P eye surgery: right  CAD S/P percutaneous coronary angioplasty: Stent x 2        Plan:  ASA, Plavix, Lipitor, and Metoprolol for CAD.  Lisinopril for HTN.  Encourage CDBE.  Pt given incentive spirometer and instructed regarding use.  Good return demonstration noted.  Continue Nebs and Spiriva for COPD.  Maintain Left chest tube to waterseal.  + Airleak and crepitus.  Repeat CXR in AM.  Add Protonix for GI prophylaxis.  Colace for bowel regimen.  Add Senna for no BM.  Synthroid for history of hypothyroidism.  Lovenox for DVT prophylaxis.  Nicotine patch for tobacco cessation.  Education/instruction regarding tobacco cessation reinforced.  Discussed in AM rounds with Dr. Irizarry. Subjective: "I wish this tube could come out."  Sitting up in bed.  Denies CP or SOB.  NAD noted.  Wife at bedside.      Tele: SR                         T(F): 98.3, Max: 98.7 ( @ 11:07)  HR: 78 (66 - 80)  BP: 140/64 (112/60 - 160/54)  RR: 18 (18 - 18)  SpO2: 96% (96% - 99%) on room air at rest.          Daily     Daily Weight in k.9 (2017 00:30)        No Known Allergies      2017 05:22    136    |  95     |  11.0   ----------------------------<  105    4.0     |  30.0   |  0.61     Ca    9.0        2017 05:22    TPro  6.0    /  Alb  3.2    /  TBili  0.5    /  DBili  x      /  AST  34     /  ALT  14     /  AlkPhos  70     2017 05:22                               9.8    12.61 )-----------( 195      ( 2017 05:22 )             29.2          CXR:   IMPRESSION: Left-sided chest tube noted in place. No appreciable   pneumothorax is seen. No gross consolidation is seen.           ISABEL DAI M.D., ATTENDINGRADIOLOGIST  This document has been electronically signed. 2017 10:28AM    I&O's Detail  I & Os for 24h ending 2017 07:00  =============================================  IN:    Oral Fluid: 660 ml    lactated ringers.: 30 ml    Total IN: 690 ml  ---------------------------------------------  OUT:    Voided: 1825 ml    Indwelling Catheter - Urethral: 350 ml    Chest Tube: 240 ml    Total OUT: 2415 ml  ---------------------------------------------  Total NET: -1725 ml    I & Os for current day (as of 2017 17:25)  =============================================  IN:    Oral Fluid: 940 ml    Total IN: 940 ml  ---------------------------------------------  OUT:    Voided: 1020 ml    Chest Tube: 40 ml    Total OUT: 1060 ml  ---------------------------------------------  Total NET: -120 ml      CHEST TUBE:  [* ] YES [ ] NO  OUTPUT: Left pleural CT to waterseal. 120ml overnight (240ml x last 24 hours)   AIR LEAKS:  [ *] YES [ ] NO        Active Medications:  ceFAZolin   IVPB 2000milliGRAM(s) IV Intermittent once  enoxaparin Injectable 40milliGRAM(s) SubCutaneous every 24 hours  ALBUTerol    0.083% 2.5milliGRAM(s) Nebulizer every 6 hours PRN  ipratropium    for Nebulization 500MICROGram(s) Nebulizer every 6 hours PRN  docusate sodium 100milliGRAM(s) Oral three times a day  aspirin enteric coated 81milliGRAM(s) Oral daily  lisinopril 5milliGRAM(s) Oral daily  atorvastatin 80milliGRAM(s) Oral at bedtime  metoprolol succinate ER 50milliGRAM(s) Oral daily  levothyroxine 150MICROGram(s) Oral daily  clopidogrel Tablet 75milliGRAM(s) Oral daily  tiotropium 18 MICROgram(s) Capsule 1Capsule(s) Inhalation daily  nicotine - 21 mG/24Hr(s) Patch 1patch Transdermal daily  ondansetron Injectable 4milliGRAM(s) IV Push every 6 hours PRN  ondansetron Injectable 4milliGRAM(s) IV Push every 6 hours PRN  oxyCODONE IR 10milliGRAM(s) Oral every 8 hours PRN  pantoprazole Tablet 40milliGRAM(s) Oral before breakfast      Physical Exam:    Neuro: AAOX3.  No focal deficits.    Pulm: Diminished BLL.  Scattered rhonchi bilaterally.    CV: RRR.  +S1+S2.    Abd: Soft/NT/ND.  +BS.  -BM.  +flatus.    Extremities: No edema BLE.  +pp.  - calf tenderness.    Incision(s): Left thoracotomy site ELSI and without erythema or drainage.  CT site with DSD.  Minimal Crepitus surrounding CT site.  + air leak noted.    Assessment:  66y Male  PMH CAD w/ stents, HTN , HLD,  COPD, and  L eyelid swelling.  Current tobacco Smoker.      S/P L VATS          Post operative course:  + airleak and subcutaneous emphysema to L CT site.         PAST MEDICAL & SURGICAL HISTORY:  Alcoholism /alcohol abuse: Last drink 30 years ago  CAD (coronary artery disease)  Hypothyroidism  Hypertension  Disorder of rotator cuff: Left  S/P eye surgery: right  CAD S/P percutaneous coronary angioplasty: Stent x 2        Plan:  Percocet PRN for pain control.    ASA, Plavix, Lipitor, and Metoprolol for CAD.  Lisinopril for HTN.  Encourage CDBE.  Pt given incentive spirometer and instructed regarding use.  Good return demonstration noted.  Continue Nebs and Spiriva for COPD.  Maintain Left chest tube to waterseal.  + Airleak and crepitus.  Repeat CXR in AM.  Add Protonix for GI prophylaxis.  Colace for bowel regimen.  Add Senna for no BM.  Synthroid for history of hypothyroidism.  Lovenox for DVT prophylaxis.  Nicotine patch for tobacco cessation.  Education/instruction regarding tobacco cessation reinforced.  Discussed in AM rounds with Dr. Irizarry. Subjective: "I wish this tube could come out."  Sitting up in bed.  Denies CP or SOB.  NAD noted.  Wife at bedside.      Tele: SR                         T(F): 98.3, Max: 98.7 ( @ 11:07)  HR: 78 (66 - 80)  BP: 140/64 (112/60 - 160/54)  RR: 18 (18 - 18)  SpO2: 96% (96% - 99%) on room air at rest.          Daily     Daily Weight in k.9 (2017 00:30)        No Known Allergies      2017 05:22    136    |  95     |  11.0   ----------------------------<  105    4.0     |  30.0   |  0.61     Ca    9.0        2017 05:22    TPro  6.0    /  Alb  3.2    /  TBili  0.5    /  DBili  x      /  AST  34     /  ALT  14     /  AlkPhos  70     2017 05:22                               9.8    12.61 )-----------( 195      ( 2017 05:22 )             29.2          CXR:   IMPRESSION: Left-sided chest tube noted in place. No appreciable   pneumothorax is seen. No gross consolidation is seen.           ISABEL DAI M.D., ATTENDINGRADIOLOGIST  This document has been electronically signed. 2017 10:28AM    I&O's Detail  I & Os for 24h ending 2017 07:00  =============================================  IN:    Oral Fluid: 660 ml    lactated ringers.: 30 ml    Total IN: 690 ml  ---------------------------------------------  OUT:    Voided: 1825 ml    Indwelling Catheter - Urethral: 350 ml    Chest Tube: 240 ml    Total OUT: 2415 ml  ---------------------------------------------  Total NET: -1725 ml    I & Os for current day (as of 2017 17:25)  =============================================  IN:    Oral Fluid: 940 ml    Total IN: 940 ml  ---------------------------------------------  OUT:    Voided: 1020 ml    Chest Tube: 40 ml    Total OUT: 1060 ml  ---------------------------------------------  Total NET: -120 ml      CHEST TUBE:  [* ] YES [ ] NO  OUTPUT: Left pleural CT to waterseal. 120ml overnight (240ml x last 24 hours)   AIR LEAKS:  [ *] YES [ ] NO        Active Medications:  ceFAZolin   IVPB 2000milliGRAM(s) IV Intermittent once  enoxaparin Injectable 40milliGRAM(s) SubCutaneous every 24 hours  ALBUTerol    0.083% 2.5milliGRAM(s) Nebulizer every 6 hours PRN  ipratropium    for Nebulization 500MICROGram(s) Nebulizer every 6 hours PRN  docusate sodium 100milliGRAM(s) Oral three times a day  aspirin enteric coated 81milliGRAM(s) Oral daily  lisinopril 5milliGRAM(s) Oral daily  atorvastatin 80milliGRAM(s) Oral at bedtime  metoprolol succinate ER 50milliGRAM(s) Oral daily  levothyroxine 150MICROGram(s) Oral daily  clopidogrel Tablet 75milliGRAM(s) Oral daily  tiotropium 18 MICROgram(s) Capsule 1Capsule(s) Inhalation daily  nicotine - 21 mG/24Hr(s) Patch 1patch Transdermal daily  ondansetron Injectable 4milliGRAM(s) IV Push every 6 hours PRN  ondansetron Injectable 4milliGRAM(s) IV Push every 6 hours PRN  oxyCODONE IR 10milliGRAM(s) Oral every 8 hours PRN  pantoprazole Tablet 40milliGRAM(s) Oral before breakfast      Physical Exam:    Neuro: AAOX3.  No focal deficits.    Pulm: Diminished BLL.  Scattered rhonchi bilaterally.    CV: RRR.  +S1+S2.    Abd: Soft/NT/ND.  +BS.  -BM.  +flatus.    Extremities: No edema BLE.  +pp.  - calf tenderness.    Incision(s): Left thoracotomy site ELSI and without erythema or drainage.  CT site with DSD.  Minimal Crepitus surrounding CT site.  + air leak noted.    Assessment:  66y Male  PMH CAD w/ stents, HTN , HLD,  COPD, and  L eyelid swelling.  Current tobacco Smoker.      S/P L VATS          Post operative course:  + airleak and subcutaneous emphysema to L CT site.         PAST MEDICAL & SURGICAL HISTORY:  Alcoholism /alcohol abuse: Last drink 30 years ago  CAD (coronary artery disease)  Hypothyroidism  Hypertension  Disorder of rotator cuff: Left  S/P eye surgery: right  CAD S/P percutaneous coronary angioplasty: Stent x 2        Plan:  Oxycodone IR PRN for pain control.    ASA, Plavix, Lipitor, and Metoprolol for CAD.  Lisinopril for HTN.  Encourage CDBE.  Pt given incentive spirometer and instructed regarding use.  Good return demonstration noted.  Continue Nebs and Spiriva for COPD.  Maintain Left chest tube to waterseal.  + Airleak and crepitus.  Repeat CXR in AM.  Add Protonix for GI prophylaxis.  Colace for bowel regimen.  Add Senna for no BM.  Synthroid for history of hypothyroidism.  Lovenox for DVT prophylaxis.  Nicotine patch for tobacco cessation.  Education/instruction regarding tobacco cessation reinforced.  Discussed in AM rounds with Dr. Irizarry.

## 2017-01-30 LAB
ANION GAP SERPL CALC-SCNC: 9 MMOL/L — SIGNIFICANT CHANGE UP (ref 5–17)
BUN SERPL-MCNC: 8 MG/DL — SIGNIFICANT CHANGE UP (ref 8–20)
CALCIUM SERPL-MCNC: 8.9 MG/DL — SIGNIFICANT CHANGE UP (ref 8.6–10.2)
CHLORIDE SERPL-SCNC: 100 MMOL/L — SIGNIFICANT CHANGE UP (ref 98–107)
CO2 SERPL-SCNC: 32 MMOL/L — HIGH (ref 22–29)
CREAT SERPL-MCNC: 0.53 MG/DL — SIGNIFICANT CHANGE UP (ref 0.5–1.3)
GLUCOSE SERPL-MCNC: 108 MG/DL — SIGNIFICANT CHANGE UP (ref 70–115)
HCT VFR BLD CALC: 29.6 % — LOW (ref 42–52)
HGB BLD-MCNC: 10 G/DL — LOW (ref 14–18)
MAGNESIUM SERPL-MCNC: 2 MG/DL — SIGNIFICANT CHANGE UP (ref 1.8–2.5)
MCHC RBC-ENTMCNC: 29 PG — SIGNIFICANT CHANGE UP (ref 27–31)
MCHC RBC-ENTMCNC: 33.8 G/DL — SIGNIFICANT CHANGE UP (ref 32–36)
MCV RBC AUTO: 85.8 FL — SIGNIFICANT CHANGE UP (ref 80–94)
PHOSPHATE SERPL-MCNC: 2.5 MG/DL — SIGNIFICANT CHANGE UP (ref 2.4–4.7)
PLATELET # BLD AUTO: 180 K/UL — SIGNIFICANT CHANGE UP (ref 150–400)
POTASSIUM SERPL-MCNC: 3.7 MMOL/L — SIGNIFICANT CHANGE UP (ref 3.5–5.3)
POTASSIUM SERPL-SCNC: 3.7 MMOL/L — SIGNIFICANT CHANGE UP (ref 3.5–5.3)
RBC # BLD: 3.45 M/UL — LOW (ref 4.6–6.2)
RBC # FLD: 13.1 % — SIGNIFICANT CHANGE UP (ref 11–15.6)
SODIUM SERPL-SCNC: 141 MMOL/L — SIGNIFICANT CHANGE UP (ref 135–145)
WBC # BLD: 9.98 K/UL — SIGNIFICANT CHANGE UP (ref 4.8–10.8)
WBC # FLD AUTO: 9.98 K/UL — SIGNIFICANT CHANGE UP (ref 4.8–10.8)

## 2017-01-30 PROCEDURE — 71010: CPT | Mod: 26

## 2017-01-30 RX ORDER — SORBITOL SOLUTION 70 %
30 SOLUTION, ORAL MISCELLANEOUS ONCE
Qty: 0 | Refills: 0 | Status: COMPLETED | OUTPATIENT
Start: 2017-01-30 | End: 2017-01-30

## 2017-01-30 RX ORDER — COLCHICINE 0.6 MG
0.6 TABLET ORAL
Qty: 0 | Refills: 0 | Status: DISCONTINUED | OUTPATIENT
Start: 2017-01-30 | End: 2017-01-31

## 2017-01-30 RX ORDER — POTASSIUM CHLORIDE 20 MEQ
40 PACKET (EA) ORAL EVERY 4 HOURS
Qty: 0 | Refills: 0 | Status: COMPLETED | OUTPATIENT
Start: 2017-01-30 | End: 2017-01-30

## 2017-01-30 RX ADMIN — FENTANYL CITRATE 50 MICROGRAM(S): 50 INJECTION INTRAVENOUS at 00:36

## 2017-01-30 RX ADMIN — CLOPIDOGREL BISULFATE 75 MILLIGRAM(S): 75 TABLET, FILM COATED ORAL at 13:00

## 2017-01-30 RX ADMIN — Medication 0.6 MILLIGRAM(S): at 15:58

## 2017-01-30 RX ADMIN — TIOTROPIUM BROMIDE 1 CAPSULE(S): 18 CAPSULE ORAL; RESPIRATORY (INHALATION) at 08:58

## 2017-01-30 RX ADMIN — SENNA PLUS 2 TABLET(S): 8.6 TABLET ORAL at 23:02

## 2017-01-30 RX ADMIN — Medication 50 MILLIGRAM(S): at 05:09

## 2017-01-30 RX ADMIN — ATORVASTATIN CALCIUM 80 MILLIGRAM(S): 80 TABLET, FILM COATED ORAL at 23:02

## 2017-01-30 RX ADMIN — Medication 150 MICROGRAM(S): at 05:09

## 2017-01-30 RX ADMIN — Medication 40 MILLIEQUIVALENT(S): at 09:06

## 2017-01-30 RX ADMIN — OXYCODONE HYDROCHLORIDE 10 MILLIGRAM(S): 5 TABLET ORAL at 21:15

## 2017-01-30 RX ADMIN — Medication 100 MILLIGRAM(S): at 23:02

## 2017-01-30 RX ADMIN — OXYCODONE HYDROCHLORIDE 10 MILLIGRAM(S): 5 TABLET ORAL at 10:00

## 2017-01-30 RX ADMIN — PANTOPRAZOLE SODIUM 40 MILLIGRAM(S): 20 TABLET, DELAYED RELEASE ORAL at 05:09

## 2017-01-30 RX ADMIN — ENOXAPARIN SODIUM 40 MILLIGRAM(S): 100 INJECTION SUBCUTANEOUS at 23:02

## 2017-01-30 RX ADMIN — LISINOPRIL 5 MILLIGRAM(S): 2.5 TABLET ORAL at 05:09

## 2017-01-30 RX ADMIN — Medication 100 MILLIGRAM(S): at 05:09

## 2017-01-30 RX ADMIN — Medication 81 MILLIGRAM(S): at 13:00

## 2017-01-30 RX ADMIN — Medication 30 MILLILITER(S): at 09:06

## 2017-01-30 RX ADMIN — FENTANYL CITRATE 50 MICROGRAM(S): 50 INJECTION INTRAVENOUS at 00:21

## 2017-01-30 RX ADMIN — Medication 40 MILLIEQUIVALENT(S): at 13:00

## 2017-01-30 RX ADMIN — Medication 1 PATCH: at 13:00

## 2017-01-30 RX ADMIN — OXYCODONE HYDROCHLORIDE 10 MILLIGRAM(S): 5 TABLET ORAL at 20:32

## 2017-01-30 RX ADMIN — OXYCODONE HYDROCHLORIDE 10 MILLIGRAM(S): 5 TABLET ORAL at 09:05

## 2017-01-30 RX ADMIN — Medication 100 MILLIGRAM(S): at 13:00

## 2017-01-30 RX ADMIN — Medication 1 PATCH: at 13:02

## 2017-01-30 NOTE — PROGRESS NOTE ADULT - SUBJECTIVE AND OBJECTIVE BOX
Subjective: " I have to go home ...all this waiting around is getting to me"  OOB chair    T(C): 36.9, Max: 37.1 (01-29 @ 11:07)  HR: 78 (70 - 80)  BP: 138/62 (112/60 - 140/64)  RR: 16 (16 - 18)  SpO2: 98% (95% - 98%)                                               Tele: SR  70 's    CHEST TUBE:        L pleural      OUTPUT:       20 ml      per 24 hours     Drainage:  serosang  AIR LEAKS:  [x ] YES [ ] NO          30 Jan 2017 05:12    141    |  100    |  8.0    ----------------------------<  108    3.7     |  32.0   |  0.53     Ca    8.9        30 Jan 2017 05:12  Phos  2.5       30 Jan 2017 05:12  Mg     2.0       30 Jan 2017 05:12    TPro  6.0    /  Alb  3.2    /  TBili  0.5    /  DBili  x      /  AST  34     /  ALT  14     /  AlkPhos  70     29 Jan 2017 05:22                               10.0   9.98  )-----------( 180      ( 30 Jan 2017 05:12 )             29.6                 CAPILLARY BLOOD GLUCOSE           CXR: no PTX , volume loss L side      Physical Exam:    Neuro: alert, pleasant    Pulm: diminshed L base  L CT insitu waterseal + airleak, no crepitus  DSD in place    CV:S1S2  RRR    Abd:soft No BM  x 3 days     Extremities: no edema  no calf pain        Assessment::67 y/o male current tobacco,  previous ETOH abuse, CAD w/ stents, HTN  ^ chol COPD L eyelid swelling now S/P L VATS  1/27          PAST MEDICAL & SURGICAL HISTORY:  Alcoholism /alcohol abuse: Last drink 30 years ago  CAD (coronary artery disease)  Hypothyroidism  Hypertension  Disorder of rotator cuff: Left  S/P eye surgery: right  CAD S/P percutaneous coronary angioplasty: Stent x 2        Plan:  Continue ASA plavix statin betablocker CAD  Nebs COPD   Replete potassium  nicotine patch tobacco cessation  Education/instruction re tobacco cessation reinforced  oral analgesics for surgical incision > pt not narcotic naive...treats back injury w/ oxycodone @ home  Place chest tube to pneumostat for persistent airleak  Possible d/c home am  Discussed am rounds Dr Alfonso

## 2017-01-31 ENCOUNTER — TRANSCRIPTION ENCOUNTER (OUTPATIENT)
Age: 67
End: 2017-01-31

## 2017-01-31 VITALS
RESPIRATION RATE: 18 BRPM | HEART RATE: 77 BPM | OXYGEN SATURATION: 100 % | SYSTOLIC BLOOD PRESSURE: 108 MMHG | DIASTOLIC BLOOD PRESSURE: 50 MMHG

## 2017-01-31 LAB
ALBUMIN SERPL ELPH-MCNC: 3.4 G/DL — SIGNIFICANT CHANGE UP (ref 3.3–5.2)
ALP SERPL-CCNC: 75 U/L — SIGNIFICANT CHANGE UP (ref 40–120)
ALT FLD-CCNC: 13 U/L — SIGNIFICANT CHANGE UP
ANION GAP SERPL CALC-SCNC: 10 MMOL/L — SIGNIFICANT CHANGE UP (ref 5–17)
AST SERPL-CCNC: 15 U/L — SIGNIFICANT CHANGE UP
BILIRUB SERPL-MCNC: 0.7 MG/DL — SIGNIFICANT CHANGE UP (ref 0.4–2)
BUN SERPL-MCNC: 6 MG/DL — LOW (ref 8–20)
CALCIUM SERPL-MCNC: 9.3 MG/DL — SIGNIFICANT CHANGE UP (ref 8.6–10.2)
CHLORIDE SERPL-SCNC: 99 MMOL/L — SIGNIFICANT CHANGE UP (ref 98–107)
CO2 SERPL-SCNC: 30 MMOL/L — HIGH (ref 22–29)
CREAT SERPL-MCNC: 0.6 MG/DL — SIGNIFICANT CHANGE UP (ref 0.5–1.3)
GLUCOSE SERPL-MCNC: 107 MG/DL — SIGNIFICANT CHANGE UP (ref 70–115)
HCT VFR BLD CALC: 30.5 % — LOW (ref 42–52)
HGB BLD-MCNC: 10.2 G/DL — LOW (ref 14–18)
MAGNESIUM SERPL-MCNC: 2 MG/DL — SIGNIFICANT CHANGE UP (ref 1.8–2.5)
MCHC RBC-ENTMCNC: 28.3 PG — SIGNIFICANT CHANGE UP (ref 27–31)
MCHC RBC-ENTMCNC: 33.4 G/DL — SIGNIFICANT CHANGE UP (ref 32–36)
MCV RBC AUTO: 84.5 FL — SIGNIFICANT CHANGE UP (ref 80–94)
PLATELET # BLD AUTO: 232 K/UL — SIGNIFICANT CHANGE UP (ref 150–400)
POTASSIUM SERPL-MCNC: 3.8 MMOL/L — SIGNIFICANT CHANGE UP (ref 3.5–5.3)
POTASSIUM SERPL-SCNC: 3.8 MMOL/L — SIGNIFICANT CHANGE UP (ref 3.5–5.3)
PROT SERPL-MCNC: 6.3 G/DL — LOW (ref 6.6–8.7)
RBC # BLD: 3.61 M/UL — LOW (ref 4.6–6.2)
RBC # FLD: 12.9 % — SIGNIFICANT CHANGE UP (ref 11–15.6)
SODIUM SERPL-SCNC: 139 MMOL/L — SIGNIFICANT CHANGE UP (ref 135–145)
WBC # BLD: 9.69 K/UL — SIGNIFICANT CHANGE UP (ref 4.8–10.8)
WBC # FLD AUTO: 9.69 K/UL — SIGNIFICANT CHANGE UP (ref 4.8–10.8)

## 2017-01-31 PROCEDURE — 94640 AIRWAY INHALATION TREATMENT: CPT

## 2017-01-31 PROCEDURE — 88342 IMHCHEM/IMCYTCHM 1ST ANTB: CPT

## 2017-01-31 PROCEDURE — 88341 IMHCHEM/IMCYTCHM EA ADD ANTB: CPT

## 2017-01-31 PROCEDURE — 36415 COLL VENOUS BLD VENIPUNCTURE: CPT

## 2017-01-31 PROCEDURE — 88305 TISSUE EXAM BY PATHOLOGIST: CPT

## 2017-01-31 PROCEDURE — 86900 BLOOD TYPING SEROLOGIC ABO: CPT

## 2017-01-31 PROCEDURE — 86850 RBC ANTIBODY SCREEN: CPT

## 2017-01-31 PROCEDURE — 86901 BLOOD TYPING SEROLOGIC RH(D): CPT

## 2017-01-31 PROCEDURE — 80048 BASIC METABOLIC PNL TOTAL CA: CPT

## 2017-01-31 PROCEDURE — 83735 ASSAY OF MAGNESIUM: CPT

## 2017-01-31 PROCEDURE — 88309 TISSUE EXAM BY PATHOLOGIST: CPT

## 2017-01-31 PROCEDURE — 86920 COMPATIBILITY TEST SPIN: CPT

## 2017-01-31 PROCEDURE — 71045 X-RAY EXAM CHEST 1 VIEW: CPT

## 2017-01-31 PROCEDURE — 84100 ASSAY OF PHOSPHORUS: CPT

## 2017-01-31 PROCEDURE — 80053 COMPREHEN METABOLIC PANEL: CPT

## 2017-01-31 PROCEDURE — 85027 COMPLETE CBC AUTOMATED: CPT

## 2017-01-31 PROCEDURE — 88307 TISSUE EXAM BY PATHOLOGIST: CPT

## 2017-01-31 PROCEDURE — 71010: CPT | Mod: 26

## 2017-01-31 RX ORDER — NICOTINE POLACRILEX 2 MG
1 GUM BUCCAL
Qty: 14 | Refills: 0 | OUTPATIENT
Start: 2017-01-31 | End: 2017-02-14

## 2017-01-31 RX ORDER — COLCHICINE 0.6 MG
1 TABLET ORAL
Qty: 28 | Refills: 0 | OUTPATIENT
Start: 2017-01-31 | End: 2017-02-14

## 2017-01-31 RX ORDER — OXYCODONE HYDROCHLORIDE 5 MG/1
1 TABLET ORAL
Qty: 30 | Refills: 0 | OUTPATIENT
Start: 2017-01-31 | End: 2017-02-05

## 2017-01-31 RX ORDER — OXYCODONE HYDROCHLORIDE 5 MG/1
5 TABLET ORAL ONCE
Qty: 0 | Refills: 0 | Status: DISCONTINUED | OUTPATIENT
Start: 2017-01-31 | End: 2017-01-31

## 2017-01-31 RX ORDER — DOCUSATE SODIUM 100 MG
1 CAPSULE ORAL
Qty: 0 | Refills: 0 | COMMUNITY
Start: 2017-01-31

## 2017-01-31 RX ADMIN — CLOPIDOGREL BISULFATE 75 MILLIGRAM(S): 75 TABLET, FILM COATED ORAL at 11:21

## 2017-01-31 RX ADMIN — Medication 1 PATCH: at 11:15

## 2017-01-31 RX ADMIN — OXYCODONE HYDROCHLORIDE 10 MILLIGRAM(S): 5 TABLET ORAL at 06:10

## 2017-01-31 RX ADMIN — PANTOPRAZOLE SODIUM 40 MILLIGRAM(S): 20 TABLET, DELAYED RELEASE ORAL at 05:35

## 2017-01-31 RX ADMIN — Medication 100 MILLIGRAM(S): at 05:35

## 2017-01-31 RX ADMIN — OXYCODONE HYDROCHLORIDE 5 MILLIGRAM(S): 5 TABLET ORAL at 03:00

## 2017-01-31 RX ADMIN — OXYCODONE HYDROCHLORIDE 10 MILLIGRAM(S): 5 TABLET ORAL at 05:34

## 2017-01-31 RX ADMIN — TIOTROPIUM BROMIDE 1 CAPSULE(S): 18 CAPSULE ORAL; RESPIRATORY (INHALATION) at 09:50

## 2017-01-31 RX ADMIN — Medication 100 MILLIGRAM(S): at 14:15

## 2017-01-31 RX ADMIN — Medication 81 MILLIGRAM(S): at 11:16

## 2017-01-31 RX ADMIN — Medication 0.6 MILLIGRAM(S): at 05:34

## 2017-01-31 RX ADMIN — OXYCODONE HYDROCHLORIDE 10 MILLIGRAM(S): 5 TABLET ORAL at 14:15

## 2017-01-31 RX ADMIN — LISINOPRIL 5 MILLIGRAM(S): 2.5 TABLET ORAL at 05:35

## 2017-01-31 RX ADMIN — OXYCODONE HYDROCHLORIDE 5 MILLIGRAM(S): 5 TABLET ORAL at 02:17

## 2017-01-31 RX ADMIN — Medication 150 MICROGRAM(S): at 05:35

## 2017-01-31 RX ADMIN — OXYCODONE HYDROCHLORIDE 10 MILLIGRAM(S): 5 TABLET ORAL at 15:15

## 2017-01-31 RX ADMIN — Medication 50 MILLIGRAM(S): at 05:35

## 2017-01-31 RX ADMIN — Medication 1 PATCH: at 11:23

## 2017-01-31 NOTE — DISCHARGE NOTE ADULT - HOSPITAL COURSE
1/27 robotic left upper lobectomy with persistent postop air leak requiring pneumostat to chest tube.

## 2017-01-31 NOTE — DISCHARGE NOTE ADULT - NS AS ACTIVITY OBS
Walking-Indoors allowed/No Heavy lifting/straining/Showering allowed/Do not drive or operate machinery/Walking-Outdoors allowed/Stairs allowed/Do not make important decisions

## 2017-01-31 NOTE — DISCHARGE NOTE ADULT - CARE PROVIDER_API CALL
Jcarlos Alfonso), Thoracic Surgery  [address] [place] [address]  Phone: [phone]  Fax: [phone]

## 2017-01-31 NOTE — DISCHARGE NOTE ADULT - CARE PLAN
Principal Discharge DX:	Lung mass  Goal:	Recover from surgery  Instructions for follow-up, activity and diet:	Follow up with Dr. Jcarlos Alfonso on Thursday February 9 at 11:30am.  Call the office at [phone] if you have any questions.  The cardiac surgery office is on the second floor of [facility].   Take the [place]den ("G") elevators to 2 and make a left.   Walk down to the second hallway on your left (before the double doors).   Sign says Cardiovascular and Thoracic Surgery.  The waiting room is on your left.    Keep the chest tube site covered with a dry clean dressing. You should not shower, but you can wash the rest of your body with a sponge bath and keep the site clean and dry.     Please take your medications as prescribed and continue your preop medications.    Drain the Pneumostat daily or as needed to prevent leakage. Please notify the doctor immediately if there are any problems with the drain.    Please call the Cardiothoracic Surgery office at [phone] if you are experiencing any shortness of breath, chest pain, fevers or chills, drainage from the incisions, persistent nausea, vomiting or if you have any questions about your medications. If the symptoms are severe, call 911 and go to the nearest hospital.  Secondary Diagnosis:	Smoker  Goal:	Refrain from smoking  Instructions for follow-up, activity and diet:	**You have been prescribed a nicotine patch for two weeks. Please do NOT smoke while your lung is healing. Please see your primary care doctor, or contact the [facility] for Tobacco Control for help to quit, WITHIN THE NEXT TWO WEEKS.**    Smoking Cessation    Tobacco use is the most common preventable cause of death. About half of the people who don't quit smoking die of smoking-related problems. Quitting smoking is important for your health. Soon after you quit, your circulation begins to improve, and your blood pressure starts to return to normal. Your sense of smell and taste return, and it's easier for you to breathe. In the long term, giving up tobacco can help you live longer. Your risk of getting cancer decreases with each year you stay smoke-free.    Quitting is not easy. You may have short-term effects such as weight gain, irritability, and anxiety. Some people try several times before they succeed. There are many ways to quit smoking. Some people stop "cold turkey." Others benefit from step-by-step manuals, counseling, or medicines or products that help reduce nicotine addiction. The [facility] for Tobacco Control can help you find the best way for you to quit.    Resources:  [facility]  [email or web address]/find-care/locations/center-tobacco-control Principal Discharge DX:	Lung mass  Goal:	Recover from surgery  Instructions for follow-up, activity and diet:	Follow up with Dr. Jcarlos Alfonso on Thursday February 9 at 11:30am.  Call the office at [phone] if you have any questions.  The cardiac surgery office is on the second floor of [facility].   Take the Gulden ("G") elevators to 2 and make a left.   Walk down to the second hallway on your left (before the double doors).   Sign says Cardiovascular and Thoracic Surgery.  The waiting room is on your left.    Keep the chest tube site covered with a dry clean dressing. You should not shower, but you can wash the rest of your body with a sponge bath and keep the site clean and dry.     Please take your medications as prescribed and continue your preop medications.    Drain the Pneumostat daily or as needed to prevent leakage. Please notify the doctor immediately if there are any problems with the drain.    Please call the Cardiothoracic Surgery office at [phone] if you are experiencing any shortness of breath, chest pain, fevers or chills, drainage from the incisions, persistent nausea, vomiting or if you have any questions about your medications. If the symptoms are severe, call 911 and go to the nearest hospital.  Secondary Diagnosis:	Smoker  Goal:	Refrain from smoking  Instructions for follow-up, activity and diet:	**You have been prescribed a nicotine patch for two weeks. Please do NOT smoke while your lung is healing. Please see your primary care doctor, or contact the [facility] for Tobacco Control for help to quit, WITHIN THE NEXT TWO WEEKS.**    Smoking Cessation    Tobacco use is the most common preventable cause of death. About half of the people who don't quit smoking die of smoking-related problems. Quitting smoking is important for your health. Soon after you quit, your circulation begins to improve, and your blood pressure starts to return to normal. Your sense of smell and taste return, and it's easier for you to breathe. In the long term, giving up tobacco can help you live longer. Your risk of getting cancer decreases with each year you stay smoke-free.    Quitting is not easy. You may have short-term effects such as weight gain, irritability, and anxiety. Some people try several times before they succeed. There are many ways to quit smoking. Some people stop "cold turkey." Others benefit from step-by-step manuals, counseling, or medicines or products that help reduce nicotine addiction. The [facility] for Tobacco Control can help you find the best way for you to quit.    Resources:  [facility]  [email or web address]/find-care/locations/center-tobacco-control

## 2017-01-31 NOTE — DISCHARGE NOTE ADULT - MEDICATION SUMMARY - MEDICATIONS TO TAKE
I will START or STAY ON the medications listed below when I get home from the hospital:    oxyCODONE 5 mg oral tablet  -- 1 tab(s) by mouth every 4 hours -for moderate pain MDD:6  -- Indication: For Pain    aspirin 81 mg oral tablet  -- 1 tab(s) by mouth once a day  -- Indication: For CAD (coronary artery disease)    lisinopril 5 mg oral tablet  --  by mouth   -- Indication: For Hypertension    colchicine 0.6 mg oral tablet  -- 1 tab(s) by mouth 2 times a day  -- Indication: For Pleural effusion    atorvastatin 80 mg oral tablet  -- 1 tab(s) by mouth once a day  -- Indication: For Hyperlipidemia    Plavix 75 mg oral tablet  -- 1 tab(s) by mouth once a day  -- Indication: For CAD (coronary artery disease)    metoprolol succinate 50 mg oral tablet, extended release  -- 1 tab(s) by mouth once a day  -- Indication: For CAD (coronary artery disease)    Incruse Ellipta 62.5 mcg/inh inhalation powder  --  inhaled once a day  -- Indication: For COPD    docusate sodium 100 mg oral capsule  -- 1 cap(s) by mouth 3 times a day  -- Indication: For Stool softener    nicotine 21 mg/24 hr transdermal film, extended release  -- 1 patch by transdermal patch once a day  -- Indication: For Smoking    Synthroid 150 mcg (0.15 mg) oral tablet  -- 1 tab(s) by mouth once a day  -- Indication: For Hypothyroidism

## 2017-01-31 NOTE — PROGRESS NOTE ADULT - SUBJECTIVE AND OBJECTIVE BOX
Subjective: "I have some chest discomfort." Pt seated at side of bed eating lunch.    Vital Signs:  Vital Signs Last 24 Hrs  T(C): 36.6, Max: 36.8 (01-30 @ 15:00)  T(F): 97.8, Max: 98.3 (01-30 @ 15:00)  HR: 78 (75 - 98)  BP: 112/50 (112/50 - 148/50)  RR: 18 (16 - 18)  SpO2: 100% (98% - 100%)RA    Telemetry/Alarms: SR , occasional sinus arrhythmia    Relevant labs, radiology and Medications reviewed    Pertinent Physical Exam  lungs clear  hr rrr  abd soft non tender  left chest inc dressing c/d/i, pneumostat with minimal drainage.    I & Os for 24h ending 01-31 @ 07:00  =============================================  IN:    Oral Fluid: 360 ml    Total IN: 360 ml  ---------------------------------------------  OUT:    Voided: 750 ml    Chest Tube: 130 ml    Stool: 1 ml    Total OUT: 881 ml  ---------------------------------------------  Total NET: -521 ml    I & Os for current day (as of 01-31 @ 13:33)  =============================================  IN:    Oral Fluid: 120 ml    Total IN: 120 ml  ---------------------------------------------  OUT:    Voided: 300 ml    Drain: 50 ml    Total OUT: 350 ml  ---------------------------------------------  Total NET: -230 ml      Assessment  67 y/o male current tobacco,  previous ETOH abuse, CAD w/ stents, HTN  ^ chol COPD L eyelid swelling now   1/27 S/P L VATS upper lobectomy    PLAN  Neuro: Pain management,   Pulm: Encourage coughing, deep breathing and use of incentive spirometry.  Daily CXR. Continue nebulizers  Cardio: Monitor telemetry/alarms, continue cardiac meds  GI: Tolerating diet. Continue stool softeners. Protonoix for acid reflux  Renal: Daily BMP, supplement electrolytes as needed  Vasc: Lovenox/SCDs for DVT prophylaxis  Heme: Stable H/H. Trend CBC daily.   ID: Off antibiotics. Stable.  Therapy: OOB/ambulate  Tubes: Girlfriend to learn how to drain pneumostat when full. Visiting nurse to drain every other day.  Disposition: Aim to D/C to home this afternoon  Discussed with Dr. Alfonso at AM rounds.

## 2017-01-31 NOTE — DISCHARGE NOTE ADULT - PATIENT PORTAL LINK FT
“You can access the FollowHealth Patient Portal, offered by [facility], by registering with the following website: [email or web address]”

## 2017-01-31 NOTE — DISCHARGE NOTE ADULT - PLAN OF CARE
Recover from surgery Follow up with Dr. Jcarlos Alfonso on Thursday February 9 at 11:30am.  Call the office at [phone] if you have any questions.  The cardiac surgery office is on the second floor of [facility].   Take the [place] ("G") elevators to 2 and make a left.   Walk down to the second hallway on your left (before the double doors).   Sign says Cardiovascular and Thoracic Surgery.  The waiting room is on your left.    Keep the chest tube site covered with a dry clean dressing. You should not shower, but you can wash the rest of your body with a sponge bath and keep the site clean and dry.     Please take your medications as prescribed and continue your preop medications.    Drain the Pneumostat daily or as needed to prevent leakage. Please notify the doctor immediately if there are any problems with the drain.    Please call the Cardiothoracic Surgery office at [phone] if you are experiencing any shortness of breath, chest pain, fevers or chills, drainage from the incisions, persistent nausea, vomiting or if you have any questions about your medications. If the symptoms are severe, call 911 and go to the nearest hospital. Refrain from smoking **You have been prescribed a nicotine patch for two weeks. Please do NOT smoke while your lung is healing. Please see your primary care doctor, or contact the [facility] for Tobacco Control for help to quit, WITHIN THE NEXT TWO WEEKS.**    Smoking Cessation    Tobacco use is the most common preventable cause of death. About half of the people who don't quit smoking die of smoking-related problems. Quitting smoking is important for your health. Soon after you quit, your circulation begins to improve, and your blood pressure starts to return to normal. Your sense of smell and taste return, and it's easier for you to breathe. In the long term, giving up tobacco can help you live longer. Your risk of getting cancer decreases with each year you stay smoke-free.    Quitting is not easy. You may have short-term effects such as weight gain, irritability, and anxiety. Some people try several times before they succeed. There are many ways to quit smoking. Some people stop "cold turkey." Others benefit from step-by-step manuals, counseling, or medicines or products that help reduce nicotine addiction. The [facility] for Tobacco Control can help you find the best way for you to quit.    Resources:  [facility]  [email or web address].com/find-care/locations/center-tobacco-control

## 2017-01-31 NOTE — DISCHARGE NOTE ADULT - NS MD DC FALL RISK RISK
For information on Fall & Injury Prevention, visit [email or web address]

## 2017-01-31 NOTE — DISCHARGE NOTE ADULT - CARE PROVIDERS DIRECT ADDRESSES
[email or web address]

## 2017-01-31 NOTE — DISCHARGE NOTE ADULT - MEDICATION SUMMARY - MEDICATIONS TO STOP TAKING
I will STOP taking the medications listed below when I get home from the hospital:    hydrocodone aceteminophen  --   , As Needed

## 2017-02-06 LAB — SURGICAL PATHOLOGY FINAL REPORT - CH: SIGNIFICANT CHANGE UP

## 2017-02-10 ENCOUNTER — APPOINTMENT (OUTPATIENT)
Dept: THORACIC SURGERY | Facility: CLINIC | Age: 67
End: 2017-02-10

## 2017-02-10 VITALS
DIASTOLIC BLOOD PRESSURE: 67 MMHG | BODY MASS INDEX: 27.6 KG/M2 | OXYGEN SATURATION: 98 % | HEART RATE: 89 BPM | RESPIRATION RATE: 16 BRPM | SYSTOLIC BLOOD PRESSURE: 113 MMHG | WEIGHT: 150 LBS | HEIGHT: 62 IN

## 2017-02-16 ENCOUNTER — APPOINTMENT (OUTPATIENT)
Dept: THORACIC SURGERY | Facility: CLINIC | Age: 67
End: 2017-02-16

## 2017-02-16 VITALS
OXYGEN SATURATION: 98 % | HEIGHT: 63 IN | RESPIRATION RATE: 16 BRPM | DIASTOLIC BLOOD PRESSURE: 69 MMHG | HEART RATE: 76 BPM | WEIGHT: 150 LBS | SYSTOLIC BLOOD PRESSURE: 134 MMHG | BODY MASS INDEX: 26.58 KG/M2

## 2017-07-20 ENCOUNTER — APPOINTMENT (OUTPATIENT)
Dept: THORACIC SURGERY | Facility: CLINIC | Age: 67
End: 2017-07-20

## 2017-07-26 ENCOUNTER — TRANSCRIPTION ENCOUNTER (OUTPATIENT)
Age: 67
End: 2017-07-26

## 2019-07-17 NOTE — DISCHARGE NOTE ADULT - NURSING SECTION COMPLETE
[facility] 7-07-19 Patient/Caregiver provided printed discharge information.

## 2021-06-07 ENCOUNTER — TRANSCRIPTION ENCOUNTER (OUTPATIENT)
Age: 71
End: 2021-06-07

## 2021-06-09 NOTE — CONSULT LETTER
[Dear  ___] : Dear  [unfilled], [Courtesy Letter:] : I had the pleasure of seeing your patient, [unfilled], in my office today. [Please see my note below.] : Please see my note below. [Consult Closing:] : Thank you very much for allowing me to participate in the care of this patient.  If you have any questions, please do not hesitate to contact me. [Sincerely,] : Sincerely, [FreeTextEntry3] : Jcarlos Alfonso MD\par Director of Thoracic, [facility]\par Cardiovascular & Thoracic Surgery\par [address] [place] [address] \[place] [address]\par Tel: [phone]\par Fax: [phone]\par

## 2021-06-09 NOTE — HISTORY OF PRESENT ILLNESS
[FreeTextEntry1] : Mr. ORTIZ is a 70 year old male who is status post flexible bronchoscopy, left robot  assisted lung resection or a left upper lobe mass in 1.27.2017. 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

## 2021-06-09 NOTE — DATA REVIEWED
[FreeTextEntry1] : \par 4.26.21 PET/Ct rom [facility] Radiology Reviewed \par \par 4.2.21 Non- Contrast CT Chest Scan from [facility] \par -Interval increase in size o an airspace opacity in the posterior aspect to the  let upper lobe that now measures approximately 2 cm anteroposterior 1.2 cm transverse x 1.5 cm craniocaudal (previously, this airspace  opacity measured approximately 0.6 cm anteroposterior by 0.9 cm tranverse by 0.9cm craniocaudal on 12/28/2020) and now demonstrates increased spiculated margins. TID lesion is concerning or a neoplastic  lesion. \par - Mild interval reduction in the density and size of the airspace  opacity in the left upper lobe that now measure approximately 1.5 cm  anteroposterior x 0.9 cm tranverse x 1 cm  craniocaudal  remains of uncertain etiology

## 2021-06-10 ENCOUNTER — APPOINTMENT (OUTPATIENT)
Dept: THORACIC SURGERY | Facility: CLINIC | Age: 71
End: 2021-06-10

## 2022-10-06 NOTE — DISCHARGE NOTE ADULT - NSCORESITESY/N_GEN_A_CORE_RD
VASECTOMY PROCEDURE    A.  INITIAL OFFICE VISIT  The vasectomy procedure will be explained to you. You will be asked about any major medical  problems, and a routine examination may be performed.     B.  VASECTOMY  1. Prior to procedure:  Wash area with soap each day for 3 days.  If you must cancel your appointment, please call the [facility] at [phone] as soon as possible.  Shave all scrotal hair on the day of the procedure. (SCROTUM ONLY)  Make sure you eat a good breakfast/lunch prior to the procedure.    2.  Day of procedure:  When you come for the vasectomy procedure, please bring with you a handwritten letter, addressed to your physician, dated and signed by both of you containing the following information:  Your reasons for wanting the sterilization procedure.  Your understanding that (write out these three items in the letter):  1. The procedure must be considered irreversible;   2. there is no 100% guarantee of lasting sterility;   3. the procedure is an operation and there are possible complications.    *NOTE:  You must have another person available to drive you home after the procedure. The medication given for the procedure may cause drowsiness or decreased reflexes so you will not be able to drive.    3.  After Procedure:  Remember, you are not sterile until a semen analysis is shown to contain no sperm.  Take it easy for 2 to 3 days.  Wear a jock strap for 10 days after surgery to support your scrotum.   Avoid rigorous exercise, straining or lifting greater than 20 pounds for 10 days.   Sexual relations may resume after 10 days.    C.  SEMEN CHECK  1. The container and written instructions you will use is given to you after the vasectomy is completed.    D.  VASECTOMY PROCEDURES  If your vasectomy procedure is scheduled in the office and prescriptions were given to you, take the medicine as follows:  Pain Pills (Tylenol #3)                                                   2  tabs, 1-2  hours before procedure  Relaxation pills (Valium or Diazepam 5 mg)                 2  tabs, 1-2 hours before procedure  Antibiotics (Keflex or Cephalexin)                                 1  tab every 8 hours after procedure    Please do not take medication on an empty stomach. Make sure you eat a good breakfast/lunch prior to procedure.    No

## 2023-03-07 NOTE — H&P PST ADULT - NS ABD PE RECTAL EXAM
Noted. [id number] Barbara Mcclain for a nurse appt for Depo medrol 80 mg IM x 1 dose. Or we can send in Prednisone taper to take as directed. Both work about the same. OK for order. If pt not feeling better after that, I would recommend appt in office for follow up.  Thanks -WS patient refused

## 2025-03-13 NOTE — H&P PST ADULT - VASCULAR
severe abdominal pain refractory to PO outpatient treatments in setting of IgA vasculitis
Equal and normal pulses (carotid, femoral, dorsalis pedis)

## 2025-05-02 NOTE — PATIENT PROFILE ADULT. - NS TRANSFER PATIENT BELONGINGS
[Initial Evaluation] : an initial evaluation [DM Type 2] : DM Type 2 Clothing/Cell Phone/PDA (specify)
